# Patient Record
Sex: FEMALE | Race: WHITE | Employment: OTHER | URBAN - NONMETROPOLITAN AREA
[De-identification: names, ages, dates, MRNs, and addresses within clinical notes are randomized per-mention and may not be internally consistent; named-entity substitution may affect disease eponyms.]

---

## 2017-01-12 ENCOUNTER — OFFICE VISIT (OUTPATIENT)
Dept: OPTOMETRY | Age: 82
End: 2017-01-12

## 2017-01-12 DIAGNOSIS — H53.8 BLURRED VISION, RIGHT EYE: ICD-10-CM

## 2017-01-12 DIAGNOSIS — H52.4 HYPEROPIA OF BOTH EYES WITH ASTIGMATISM AND PRESBYOPIA: Primary | ICD-10-CM

## 2017-01-12 DIAGNOSIS — H52.03 HYPEROPIA OF BOTH EYES WITH ASTIGMATISM AND PRESBYOPIA: Primary | ICD-10-CM

## 2017-01-12 DIAGNOSIS — H52.203 HYPEROPIA OF BOTH EYES WITH ASTIGMATISM AND PRESBYOPIA: Primary | ICD-10-CM

## 2017-01-12 PROCEDURE — 92014 COMPRE OPH EXAM EST PT 1/>: CPT | Performed by: OPTOMETRIST

## 2017-01-12 ASSESSMENT — REFRACTION_MANIFEST
OS_SPHERE: +2.25
OS_ADD: +2.75
OS_CYLINDER: -3.50
OD_SPHERE: +1.25
OD_ADD: +2.75
OS_AXIS: 071
OD_CYLINDER: -2.75
OD_AXIS: 097

## 2017-01-12 ASSESSMENT — REFRACTION_WEARINGRX
OD_ADD: +2.75
OS_SPHERE: +2.00
OD_SPHERE: +1.25
OD_CYLINDER: -2.50
OD_AXIS: 097
OS_CYLINDER: -3.00
OS_AXIS: 071

## 2017-01-12 ASSESSMENT — TONOMETRY
OD_IOP_MMHG: 15
IOP_METHOD: APPLANATION W FLURESS DROP
OS_IOP_MMHG: 12

## 2017-01-12 ASSESSMENT — VISUAL ACUITY
METHOD: SNELLEN - LINEAR
CORRECTION_TYPE: GLASSES
OD_CC: 20/30 OU
OS_CC: 20/50

## 2017-01-12 ASSESSMENT — SLIT LAMP EXAM - LIDS: COMMENTS: DERMATOCHALASIS - UPPER LID

## 2017-03-09 ENCOUNTER — OFFICE VISIT (OUTPATIENT)
Dept: OPTOMETRY | Age: 82
End: 2017-03-09

## 2017-03-09 DIAGNOSIS — H52.03 HYPEROPIA OF BOTH EYES WITH ASTIGMATISM AND PRESBYOPIA: ICD-10-CM

## 2017-03-09 DIAGNOSIS — H52.4 HYPEROPIA OF BOTH EYES WITH ASTIGMATISM AND PRESBYOPIA: ICD-10-CM

## 2017-03-09 DIAGNOSIS — H53.8 BLURRED VISION, RIGHT EYE: Primary | ICD-10-CM

## 2017-03-09 DIAGNOSIS — H52.203 HYPEROPIA OF BOTH EYES WITH ASTIGMATISM AND PRESBYOPIA: ICD-10-CM

## 2017-03-09 DIAGNOSIS — H35.3290 MACULAR DEGENERATION, WET (HCC): ICD-10-CM

## 2017-03-09 PROCEDURE — 99212 OFFICE O/P EST SF 10 MIN: CPT | Performed by: OPTOMETRIST

## 2017-03-09 RX ORDER — LEVOFLOXACIN 500 MG/1
500 TABLET, FILM COATED ORAL DAILY
COMMUNITY

## 2017-03-09 RX ORDER — BENOXINATE HCL/FLUORESCEIN SOD 0.4%-0.25%
1 DROPS OPHTHALMIC (EYE) ONCE
Status: COMPLETED | OUTPATIENT
Start: 2017-03-09 | End: 2017-03-09

## 2017-03-09 RX ORDER — ATORVASTATIN CALCIUM 40 MG/1
TABLET, FILM COATED ORAL
COMMUNITY
Start: 2016-12-03 | End: 2017-03-09 | Stop reason: ALTCHOICE

## 2017-03-09 RX ADMIN — Medication 1 DROP: at 13:28

## 2017-03-09 ASSESSMENT — REFRACTION_MANIFEST
OS_ADD: +2.75
OD_SPHERE: +1.75
OS_CYLINDER: -3.50
OD_CYLINDER: -3.25
OS_AXIS: 074
OD_AXIS: 097
OS_SPHERE: +2.25
OD_ADD: +2.75

## 2017-03-09 ASSESSMENT — VISUAL ACUITY
OS_CC+: -1
OS_CC: 20/40
CORRECTION_TYPE: GLASSES
OD_CC: 20/30 OU
METHOD: SNELLEN - LINEAR

## 2017-03-09 ASSESSMENT — REFRACTION_WEARINGRX
SPECS_TYPE: NOT FILLED
OD_AXIS: 097
OD_CYLINDER: -2.75
OS_AXIS: 071
OS_SPHERE: +2.25
OS_ADD: +2.75
OD_ADD: +2.75
OD_SPHERE: +1.25
OS_CYLINDER: -3.50

## 2017-03-09 ASSESSMENT — TONOMETRY
OD_IOP_MMHG: 12
OS_IOP_MMHG: 12
IOP_METHOD: APPLANATION W FLURESS DROP

## 2017-10-18 ENCOUNTER — HOSPITAL ENCOUNTER (OUTPATIENT)
Age: 82
Setting detail: SPECIMEN
Discharge: HOME | End: 2017-10-18
Payer: MEDICARE

## 2017-10-18 LAB
-: ABNORMAL
ALBUMIN SERPL-MCNC: 3.8 G/DL (ref 3.5–5.2)
ALBUMIN/GLOBULIN RATIO: 1.5 (ref 1–2.5)
ALP BLD-CCNC: 45 U/L (ref 35–104)
ALT SERPL-CCNC: 23 U/L (ref 5–33)
AMORPHOUS: ABNORMAL
ANION GAP SERPL CALCULATED.3IONS-SCNC: 12 MMOL/L (ref 9–17)
AST SERPL-CCNC: 19 U/L
BACTERIA: ABNORMAL
BILIRUB SERPL-MCNC: 0.4 MG/DL (ref 0.3–1.2)
BILIRUBIN URINE: NEGATIVE
BUN BLDV-MCNC: 15 MG/DL (ref 8–23)
BUN/CREAT BLD: 18 (ref 9–20)
CALCIUM SERPL-MCNC: 9 MG/DL (ref 8.6–10.4)
CASTS UA: ABNORMAL /LPF (ref 0–2)
CHLORIDE BLD-SCNC: 95 MMOL/L (ref 98–107)
CHOLESTEROL/HDL RATIO: 2.4
CHOLESTEROL: 144 MG/DL
CO2: 29 MMOL/L (ref 20–31)
COLOR: ABNORMAL
COMMENT UA: ABNORMAL
CREAT SERPL-MCNC: 0.83 MG/DL (ref 0.5–0.9)
CRYSTALS, UA: ABNORMAL /HPF
EPITHELIAL CELLS UA: ABNORMAL /HPF (ref 0–5)
GFR AFRICAN AMERICAN: >60 ML/MIN
GFR NON-AFRICAN AMERICAN: >60 ML/MIN
GFR SERPL CREATININE-BSD FRML MDRD: ABNORMAL ML/MIN/{1.73_M2}
GFR SERPL CREATININE-BSD FRML MDRD: ABNORMAL ML/MIN/{1.73_M2}
GLUCOSE BLD-MCNC: 91 MG/DL (ref 70–99)
GLUCOSE URINE: NEGATIVE
HCT VFR BLD CALC: 34.1 % (ref 36–46)
HDLC SERPL-MCNC: 61 MG/DL
HEMOGLOBIN: 11.1 G/DL (ref 12–16)
KETONES, URINE: NEGATIVE
LDL CHOLESTEROL: 66 MG/DL (ref 0–130)
LEUKOCYTE ESTERASE, URINE: ABNORMAL
MCH RBC QN AUTO: 28.6 PG (ref 26–34)
MCHC RBC AUTO-ENTMCNC: 32.7 G/DL (ref 31–37)
MCV RBC AUTO: 87.3 FL (ref 80–100)
MUCUS: ABNORMAL
NITRITE, URINE: NEGATIVE
OTHER OBSERVATIONS UA: ABNORMAL
PDW BLD-RTO: 14.7 % (ref 11–14.5)
PH UA: 6.5 (ref 5–6)
PLATELET # BLD: 210 K/UL (ref 140–450)
PMV BLD AUTO: 7.9 FL (ref 6–12)
POTASSIUM SERPL-SCNC: 3.6 MMOL/L (ref 3.7–5.3)
PROTEIN UA: NEGATIVE
RBC # BLD: 3.9 M/UL (ref 4–5.2)
RBC UA: ABNORMAL /HPF (ref 0–4)
RENAL EPITHELIAL, UA: ABNORMAL /HPF
SODIUM BLD-SCNC: 136 MMOL/L (ref 135–144)
SPECIFIC GRAVITY UA: 1.01 (ref 1.01–1.02)
THYROXINE, FREE: 1.9 NG/DL (ref 0.93–1.7)
TOTAL PROTEIN: 6.3 G/DL (ref 6.4–8.3)
TRICHOMONAS: ABNORMAL
TRIGL SERPL-MCNC: 84 MG/DL
TSH SERPL DL<=0.05 MIU/L-ACNC: 1.29 MIU/L (ref 0.3–5)
TURBIDITY: ABNORMAL
URINE HGB: NEGATIVE
UROBILINOGEN, URINE: NORMAL
VLDLC SERPL CALC-MCNC: NORMAL MG/DL (ref 1–30)
WBC # BLD: 4.2 K/UL (ref 3.5–11)
WBC UA: ABNORMAL /HPF (ref 0–4)
YEAST: ABNORMAL

## 2017-10-18 PROCEDURE — 80053 COMPREHEN METABOLIC PANEL: CPT

## 2017-10-18 PROCEDURE — 81001 URINALYSIS AUTO W/SCOPE: CPT

## 2017-10-18 PROCEDURE — 84443 ASSAY THYROID STIM HORMONE: CPT

## 2017-10-18 PROCEDURE — 80061 LIPID PANEL: CPT

## 2017-10-18 PROCEDURE — 36415 COLL VENOUS BLD VENIPUNCTURE: CPT

## 2017-10-18 PROCEDURE — 84439 ASSAY OF FREE THYROXINE: CPT

## 2017-10-18 PROCEDURE — 85027 COMPLETE CBC AUTOMATED: CPT

## 2018-01-15 ENCOUNTER — HOSPITAL ENCOUNTER (OUTPATIENT)
Age: 83
Setting detail: SPECIMEN
Discharge: HOME | End: 2018-01-15
Payer: MEDICARE

## 2019-04-04 ENCOUNTER — OFFICE VISIT (OUTPATIENT)
Dept: OPTOMETRY | Age: 84
End: 2019-04-04
Payer: COMMERCIAL

## 2019-04-04 DIAGNOSIS — Z96.1 PSEUDOPHAKIA OF BOTH EYES: ICD-10-CM

## 2019-04-04 DIAGNOSIS — H52.4 HYPEROPIA OF BOTH EYES WITH ASTIGMATISM AND PRESBYOPIA: Primary | ICD-10-CM

## 2019-04-04 DIAGNOSIS — H35.30 MACULAR DEGENERATION (SENILE) OF RETINA: ICD-10-CM

## 2019-04-04 DIAGNOSIS — H52.03 HYPEROPIA OF BOTH EYES WITH ASTIGMATISM AND PRESBYOPIA: Primary | ICD-10-CM

## 2019-04-04 DIAGNOSIS — H52.203 HYPEROPIA OF BOTH EYES WITH ASTIGMATISM AND PRESBYOPIA: Primary | ICD-10-CM

## 2019-04-04 PROCEDURE — 92014 COMPRE OPH EXAM EST PT 1/>: CPT | Performed by: OPTOMETRIST

## 2019-04-04 ASSESSMENT — TONOMETRY
OS_IOP_MMHG: 10
IOP_METHOD: NON-CONTACT AIR PUFF
OD_IOP_MMHG: 10

## 2019-04-04 ASSESSMENT — REFRACTION_WEARINGRX
SPECS_TYPE: BIFOCAL
OS_ADD: +2.75
OS_SPHERE: +2.25
OD_ADD: +2.75
OS_CYLINDER: -3.50
OD_AXIS: 097
OD_CYLINDER: -3.25
OS_AXIS: 074
OD_SPHERE: +1.75

## 2019-04-04 ASSESSMENT — REFRACTION_MANIFEST
OD_ADD: +2.75
OS_SPHERE: +2.25
OD_SPHERE: +2.25
OS_CYLINDER: -3.50
OS_AXIS: 074
OD_CYLINDER: -3.25
OS_ADD: +2.75
OD_AXIS: 097

## 2019-04-04 ASSESSMENT — KERATOMETRY
OD_K1POWER_DIOPTERS: 41.25
OD_K2POWER_DIOPTERS: 43.00
OD_AXISANGLE2_DEGREES: 088
OD_AXISANGLE_DEGREES: 178

## 2019-04-04 ASSESSMENT — VISUAL ACUITY
METHOD: SNELLEN - LINEAR
OS_CC: 20/40
OD_CC: 20/50 OU
CORRECTION_TYPE: GLASSES

## 2019-04-04 ASSESSMENT — SLIT LAMP EXAM - LIDS: COMMENTS: DERMATOCHALASIS - UPPER LID

## 2019-04-04 NOTE — PROGRESS NOTES
Lorenzo Del Toro presents today for   Chief Complaint   Patient presents with    Blurred Vision    Vision Exam   .    HPI     Blurred Vision     In both eyes. Vision is blurred. Context:  distance vision and reading. Comments     Last Vision Exam: 3/9/2017 AW  Last Ophthalmology Exam: Dr. Ermias Munoz 3/5/2019  Last Filled Glasses Rx: 6/9/2017  Insurance: retiree Lizet Bain  Update: [employer] and reading are getting more blurry  Sees Dr Ermias Munoz for macular degeneration                     Current Outpatient Medications   Medication Sig Dispense Refill    levofloxacin (LEVAQUIN) 500 MG tablet Take 500 mg by mouth daily      rivaroxaban (XARELTO) 20 MG TABS tablet Take 20 mg by mouth      warfarin (COUMADIN) 4 MG tablet       KLOR-CON M10 10 MEQ tablet       sulfaSALAzine (AZULFIDINE) 500 MG EC tablet       fluticasone (FLONASE) 50 MCG/ACT nasal spray 1 spray by Nasal route 2 times daily.  ferrous sulfate 325 (65 FE) MG tablet Take 325 mg by mouth 2 times daily (with meals).  metoprolol (LOPRESSOR) 50 MG tablet Take 50 mg by mouth 2 times daily.  cholestyramine (QUESTRAN) 4 G packet Take 1 packet by mouth 2 times daily.  diphenoxylate-atropine (LOMOTIL) 2.5-0.025 MG per tablet Take 1 tablet by mouth 3 times daily as needed for Diarrhea.  gabapentin (NEURONTIN) 100 MG capsule Take 100 mg by mouth 3 times daily.  oxyCODONE-acetaminophen (PERCOCET) 5-325 MG per tablet Take 1 tablet by mouth every 12 hours as needed for Pain.  tiZANidine (ZANAFLEX) 4 MG tablet Take 4 mg by mouth every 8 hours as needed.  omeprazole (PRILOSEC) 40 MG capsule Take 40 mg by mouth daily.  warfarin (COUMADIN) 1 MG tablet Take 1 mg by mouth every other day.  levothyroxine (SYNTHROID) 88 MCG tablet Take 88 mcg by mouth Daily.  valsartan-hydrochlorothiazide (DIOVAN HCT) 320-25 MG per tablet Take 1 tablet by mouth daily.       amLODIPine-benazepril (LOTREL) 10-40 MG per Not recorded          Visual Acuity (Snellen - Linear)       Right Left    Dist cc 20/70 20/40    Near cc 20/50 OU     Correction:  Glasses          Pupils     Pupils       Pupils    Right PERRL    Left PERRL              Neuro/Psych     Neuro/Psych     Oriented x3:  Yes    Mood/Affect:  Normal              Keratometry     Keratometry       K1 Axis K2 Axis    Right 41.25 088 43.00 178    Left                      Ophthalmology Exam     Wearing Rx       Sphere Cylinder Axis Add    Right +1.75 -3.25 097 +2.75    Left +2.25 -3.50 074 +2.75    Age:  2yrs    Type:  Bifocal              Manifest Refraction     Manifest Refraction       Sphere Cylinder Axis Dist VA Add    Right +2.25 -3.25 097 20/70 +2.75    Left +2.25 -3.50 074 20/40- +2.75          Manifest Refraction #2 (Auto)       Sphere Cylinder Axis Dist VA Add    Right +2.50 -3.50 092      Left +3.25 +4.25 074                 Final Rx       Sphere Cylinder Axis Add    Right +2.25 -3.25 097 +2.75    Left +2.25 -3.50 074 +2.75    Type:  Bifocal    Expiration Date:  4/4/2021            1. Hyperopia of both eyes with astigmatism and presbyopia    2. Macular degeneration (senile) of retina    3.  Pseudophakia of both eyes           Patient Instructions   New glasses if desired' keep appontments with Dr Yusef Mattson       Return in about 2 years (around 4/4/2021) for complete eye exam.

## 2019-07-07 ENCOUNTER — APPOINTMENT (OUTPATIENT)
Dept: CT IMAGING | Age: 84
End: 2019-07-07
Payer: MEDICARE

## 2019-07-07 ENCOUNTER — HOSPITAL ENCOUNTER (EMERGENCY)
Age: 84
Discharge: HOME | End: 2019-07-07
Attending: EMERGENCY MEDICINE
Payer: MEDICARE

## 2019-07-07 VITALS
RESPIRATION RATE: 12 BRPM | WEIGHT: 182 LBS | OXYGEN SATURATION: 96 % | BODY MASS INDEX: 30.29 KG/M2 | SYSTOLIC BLOOD PRESSURE: 132 MMHG | TEMPERATURE: 96.8 F | HEART RATE: 68 BPM | DIASTOLIC BLOOD PRESSURE: 78 MMHG

## 2019-07-07 DIAGNOSIS — R55 VASOVAGAL SYNCOPE: Primary | ICD-10-CM

## 2019-07-07 LAB
-: ABNORMAL
-: NORMAL
ABSOLUTE EOS #: 0.2 K/UL (ref 0–0.4)
ABSOLUTE IMMATURE GRANULOCYTE: ABNORMAL K/UL (ref 0–0.3)
ABSOLUTE LYMPH #: 1.4 K/UL (ref 1–4.8)
ABSOLUTE MONO #: 0.6 K/UL (ref 0.1–1.2)
ALBUMIN SERPL-MCNC: 4 G/DL (ref 3.5–5.2)
ALBUMIN/GLOBULIN RATIO: 1.7 (ref 1–2.5)
ALP BLD-CCNC: 57 U/L (ref 35–104)
ALT SERPL-CCNC: 22 U/L (ref 5–33)
AMORPHOUS: ABNORMAL
ANION GAP SERPL CALCULATED.3IONS-SCNC: 12 MMOL/L (ref 9–17)
AST SERPL-CCNC: 24 U/L
BACTERIA: ABNORMAL
BASOPHILS # BLD: 1 % (ref 0–1)
BASOPHILS ABSOLUTE: 0.1 K/UL (ref 0–0.2)
BILIRUB SERPL-MCNC: 0.43 MG/DL (ref 0.3–1.2)
BILIRUBIN URINE: NEGATIVE
BNP INTERPRETATION: NORMAL
BUN BLDV-MCNC: 12 MG/DL (ref 8–23)
BUN/CREAT BLD: 11 (ref 9–20)
CALCIUM SERPL-MCNC: 8.9 MG/DL (ref 8.6–10.4)
CASTS UA: ABNORMAL /LPF (ref 0–2)
CHLORIDE BLD-SCNC: 90 MMOL/L (ref 98–107)
CO2: 29 MMOL/L (ref 20–31)
COLOR: ABNORMAL
COMMENT UA: ABNORMAL
CREAT SERPL-MCNC: 1.09 MG/DL (ref 0.5–0.9)
CRYSTALS, UA: ABNORMAL /HPF
D DIMER: 783 NG/ML
DIFFERENTIAL TYPE: ABNORMAL
EOSINOPHILS RELATIVE PERCENT: 3 % (ref 1–7)
EPITHELIAL CELLS UA: ABNORMAL /HPF (ref 0–5)
GFR AFRICAN AMERICAN: 58 ML/MIN
GFR NON-AFRICAN AMERICAN: 48 ML/MIN
GFR SERPL CREATININE-BSD FRML MDRD: ABNORMAL ML/MIN/{1.73_M2}
GFR SERPL CREATININE-BSD FRML MDRD: ABNORMAL ML/MIN/{1.73_M2}
GLUCOSE BLD-MCNC: 85 MG/DL (ref 70–99)
GLUCOSE URINE: NEGATIVE
HCT VFR BLD CALC: 34.2 % (ref 36–46)
HEMOGLOBIN: 11.3 G/DL (ref 12–16)
IMMATURE GRANULOCYTES: ABNORMAL %
INR BLD: 1.1
KETONES, URINE: NEGATIVE
LACTIC ACID: 1.7 MMOL/L (ref 0.5–2.2)
LEUKOCYTE ESTERASE, URINE: ABNORMAL
LYMPHOCYTES # BLD: 26 % (ref 16–46)
MAGNESIUM: 1.8 MG/DL (ref 1.6–2.6)
MCH RBC QN AUTO: 29.3 PG (ref 26–34)
MCHC RBC AUTO-ENTMCNC: 32.9 G/DL (ref 31–37)
MCV RBC AUTO: 88.9 FL (ref 80–100)
MONOCYTES # BLD: 11 % (ref 4–11)
MUCUS: ABNORMAL
NITRITE, URINE: NEGATIVE
NRBC AUTOMATED: ABNORMAL PER 100 WBC
OTHER OBSERVATIONS UA: ABNORMAL
PARTIAL THROMBOPLASTIN TIME: 28.1 SEC (ref 27–35)
PDW BLD-RTO: 15.1 % (ref 11–14.5)
PH UA: 7.5 (ref 5–6)
PLATELET # BLD: 257 K/UL (ref 140–450)
PLATELET ESTIMATE: ABNORMAL
PMV BLD AUTO: 8.3 FL (ref 6–12)
POTASSIUM SERPL-SCNC: 3.8 MMOL/L (ref 3.7–5.3)
PRO-BNP: 293 PG/ML
PROTEIN UA: NEGATIVE
PROTHROMBIN TIME: 11.2 SEC (ref 9.4–11.3)
RBC # BLD: 3.85 M/UL (ref 4–5.2)
RBC # BLD: ABNORMAL 10*6/UL
RBC UA: ABNORMAL /HPF (ref 0–4)
REASON FOR REJECTION: NORMAL
RENAL EPITHELIAL, UA: ABNORMAL /HPF
SEG NEUTROPHILS: 59 % (ref 43–77)
SEGMENTED NEUTROPHILS ABSOLUTE COUNT: 3.2 K/UL (ref 1.8–7.7)
SODIUM BLD-SCNC: 131 MMOL/L (ref 135–144)
SPECIFIC GRAVITY UA: 1.01 (ref 1.01–1.02)
TOTAL PROTEIN: 6.3 G/DL (ref 6.4–8.3)
TRICHOMONAS: ABNORMAL
TROPONIN INTERP: NORMAL
TROPONIN T: NORMAL NG/ML
TROPONIN, HIGH SENSITIVITY: 12 NG/L (ref 0–14)
TURBIDITY: ABNORMAL
URINE HGB: NEGATIVE
UROBILINOGEN, URINE: NORMAL
WBC # BLD: 5.5 K/UL (ref 3.5–11)
WBC # BLD: ABNORMAL 10*3/UL
WBC UA: ABNORMAL /HPF (ref 0–4)
YEAST: ABNORMAL
ZZ NTE CLEAN UP: ORDERED TEST: NORMAL
ZZ NTE WITH NAME CLEAN UP: SPECIMEN SOURCE: NORMAL

## 2019-07-07 PROCEDURE — 83605 ASSAY OF LACTIC ACID: CPT

## 2019-07-07 PROCEDURE — 6360000004 HC RX CONTRAST MEDICATION: Performed by: EMERGENCY MEDICINE

## 2019-07-07 PROCEDURE — 71260 CT THORAX DX C+: CPT

## 2019-07-07 PROCEDURE — 93005 ELECTROCARDIOGRAM TRACING: CPT | Performed by: EMERGENCY MEDICINE

## 2019-07-07 PROCEDURE — 80053 COMPREHEN METABOLIC PANEL: CPT

## 2019-07-07 PROCEDURE — 85025 COMPLETE CBC W/AUTO DIFF WBC: CPT

## 2019-07-07 PROCEDURE — 81001 URINALYSIS AUTO W/SCOPE: CPT

## 2019-07-07 PROCEDURE — 70450 CT HEAD/BRAIN W/O DYE: CPT

## 2019-07-07 PROCEDURE — 83880 ASSAY OF NATRIURETIC PEPTIDE: CPT

## 2019-07-07 PROCEDURE — 85610 PROTHROMBIN TIME: CPT

## 2019-07-07 PROCEDURE — 83735 ASSAY OF MAGNESIUM: CPT

## 2019-07-07 PROCEDURE — 99284 EMERGENCY DEPT VISIT MOD MDM: CPT

## 2019-07-07 PROCEDURE — 84484 ASSAY OF TROPONIN QUANT: CPT

## 2019-07-07 PROCEDURE — 36415 COLL VENOUS BLD VENIPUNCTURE: CPT

## 2019-07-07 PROCEDURE — 2580000003 HC RX 258: Performed by: EMERGENCY MEDICINE

## 2019-07-07 PROCEDURE — 85379 FIBRIN DEGRADATION QUANT: CPT

## 2019-07-07 PROCEDURE — 85730 THROMBOPLASTIN TIME PARTIAL: CPT

## 2019-07-07 RX ORDER — 0.9 % SODIUM CHLORIDE 0.9 %
500 INTRAVENOUS SOLUTION INTRAVENOUS ONCE
Status: COMPLETED | OUTPATIENT
Start: 2019-07-07 | End: 2019-07-07

## 2019-07-07 RX ADMIN — SODIUM CHLORIDE 500 ML: 9 INJECTION, SOLUTION INTRAVENOUS at 15:15

## 2019-07-07 RX ADMIN — IOPAMIDOL 90 ML: 755 INJECTION, SOLUTION INTRAVENOUS at 15:31

## 2019-07-07 ASSESSMENT — ENCOUNTER SYMPTOMS
ABDOMINAL PAIN: 0
VOMITING: 0
COUGH: 0

## 2019-07-07 NOTE — ED PROVIDER NOTES
Final Result   Chronic microvascular disease with age-appropriate cerebral volume loss and   no acute intracranial abnormality. ED Course as of Jul 07 1549   Ilene Mortimer Jul 07, 2019   1517 EKG unremarkable, labs grossly unremarkable but d-dimer elevated so I did order CT for PE. I ordered a bolus to pretreat/post treat because her renal function is borderline    [TS]   1548 CT for PE is negative. At this time, patient will be discharged with instructions to hydrate, rest and follow up with PCP. I suspect this was vasovagal in nature. At this time the patient is without objective evidence of an acute process requiring hospitalization or inpatient management. They have remained hemodynamically stable throughout their entire ED visit and are stable for discharge with outpatient follow-up. Standard anticipatory guidance given to patient upon discharge. Have given them a specific time frame in which to follow-up and who to follow-up with. I have also advised them that they should return to the emergency department if they get worse, or not getting better or develop any new or concerning symptoms. Patient demonstrates understanding.      [TS]      ED Course User Index  [TS] Elvia Good DO         PROCEDURES:  Unless otherwise noted below, none     Procedures    FINAL IMPRESSION      1.  Vasovagal syncope          DISPOSITION/PLAN   DISPOSITION Decision To Discharge 07/07/2019 03:49:04 PM      PATIENT REFERRED TO:  Delgado Calderon MD  [facility] [address]  [address] [facility]  [phone]    In 2 days        DISCHARGE MEDICATIONS:  New Prescriptions    No medications on file          (Please note that portions of this note were completed with a voice recognition program.  Efforts were made to edit the dictations but occasionally words are mis-transcribed.)    Elvia Good DO (electronically signed)  Board Certified Emergency Physician          Elvia Good DO  07/07/19 7825

## 2019-07-08 LAB
EKG ATRIAL RATE: 62 BPM
EKG P AXIS: 48 DEGREES
EKG P-R INTERVAL: 250 MS
EKG Q-T INTERVAL: 462 MS
EKG QRS DURATION: 88 MS
EKG QTC CALCULATION (BAZETT): 468 MS
EKG R AXIS: 47 DEGREES
EKG T AXIS: 57 DEGREES
EKG VENTRICULAR RATE: 62 BPM

## 2019-12-18 ENCOUNTER — HOSPITAL ENCOUNTER (OUTPATIENT)
Age: 84
Setting detail: SPECIMEN
Discharge: HOME | End: 2019-12-18
Payer: MEDICARE

## 2019-12-18 LAB
THYROXINE, FREE: 1.21 NG/DL (ref 0.93–1.7)
TSH SERPL DL<=0.05 MIU/L-ACNC: 5.95 MIU/L (ref 0.3–5)

## 2019-12-18 PROCEDURE — 84443 ASSAY THYROID STIM HORMONE: CPT

## 2019-12-18 PROCEDURE — 36415 COLL VENOUS BLD VENIPUNCTURE: CPT

## 2019-12-18 PROCEDURE — 84439 ASSAY OF FREE THYROXINE: CPT

## 2020-10-21 ENCOUNTER — HOSPITAL ENCOUNTER (OUTPATIENT)
Age: 85
Setting detail: SPECIMEN
Discharge: HOME | End: 2020-10-21
Payer: COMMERCIAL

## 2020-10-21 LAB
-: ABNORMAL
AMORPHOUS: ABNORMAL
BACTERIA: ABNORMAL
BILIRUBIN URINE: NEGATIVE
CASTS UA: ABNORMAL /LPF (ref 0–2)
CASTS UA: ABNORMAL /LPF (ref 0–2)
COLOR: NORMAL
COMMENT UA: NORMAL
CRYSTALS, UA: ABNORMAL /HPF
EPITHELIAL CELLS UA: ABNORMAL /HPF (ref 0–5)
GLUCOSE URINE: NEGATIVE
KETONES, URINE: NEGATIVE
LEUKOCYTE ESTERASE, URINE: NEGATIVE
MUCUS: ABNORMAL
NITRITE, URINE: NEGATIVE
OTHER OBSERVATIONS UA: ABNORMAL
PH UA: 6 (ref 5–6)
PROTEIN UA: NEGATIVE
RBC UA: ABNORMAL /HPF (ref 0–4)
RENAL EPITHELIAL, UA: ABNORMAL /HPF
SPECIFIC GRAVITY UA: 1.01 (ref 1.01–1.02)
TRICHOMONAS: ABNORMAL
TURBIDITY: NORMAL
URINE HGB: NEGATIVE
UROBILINOGEN, URINE: NORMAL
WBC UA: ABNORMAL /HPF (ref 0–4)
YEAST: ABNORMAL

## 2020-10-21 PROCEDURE — 87086 URINE CULTURE/COLONY COUNT: CPT

## 2020-10-21 PROCEDURE — 87186 SC STD MICRODIL/AGAR DIL: CPT

## 2020-10-21 PROCEDURE — 81001 URINALYSIS AUTO W/SCOPE: CPT

## 2020-10-21 PROCEDURE — 87088 URINE BACTERIA CULTURE: CPT

## 2020-10-21 PROCEDURE — 87077 CULTURE AEROBIC IDENTIFY: CPT

## 2020-10-23 LAB
CULTURE: ABNORMAL
CULTURE: ABNORMAL
Lab: ABNORMAL
SPECIMEN DESCRIPTION: ABNORMAL

## 2020-11-05 ENCOUNTER — HOSPITAL ENCOUNTER (OUTPATIENT)
Age: 85
Setting detail: SPECIMEN
Discharge: HOME | End: 2020-11-05
Payer: MEDICARE

## 2020-11-05 PROCEDURE — 81001 URINALYSIS AUTO W/SCOPE: CPT

## 2020-11-05 PROCEDURE — 87086 URINE CULTURE/COLONY COUNT: CPT

## 2020-11-05 PROCEDURE — 87186 SC STD MICRODIL/AGAR DIL: CPT

## 2020-11-05 PROCEDURE — 87088 URINE BACTERIA CULTURE: CPT

## 2020-11-06 LAB
-: NORMAL
AMORPHOUS: NORMAL
BACTERIA: NORMAL
BILIRUBIN URINE: NEGATIVE
CASTS UA: NORMAL /LPF (ref 0–2)
COLOR: ABNORMAL
COMMENT UA: ABNORMAL
CRYSTALS, UA: NORMAL /HPF
EPITHELIAL CELLS UA: NORMAL /HPF (ref 0–5)
GLUCOSE URINE: NEGATIVE
KETONES, URINE: NEGATIVE
LEUKOCYTE ESTERASE, URINE: NEGATIVE
MUCUS: NORMAL
NITRITE, URINE: NEGATIVE
OTHER OBSERVATIONS UA: NORMAL
PH UA: 6.5 (ref 5–6)
PROTEIN UA: NEGATIVE
RBC UA: NORMAL /HPF (ref 0–4)
RENAL EPITHELIAL, UA: NORMAL /HPF
SPECIFIC GRAVITY UA: 1.01 (ref 1.01–1.02)
TRICHOMONAS: NORMAL
TURBIDITY: ABNORMAL
URINE HGB: NEGATIVE
UROBILINOGEN, URINE: NORMAL
WBC UA: NORMAL /HPF (ref 0–4)
YEAST: NORMAL

## 2020-11-08 LAB
CULTURE: ABNORMAL
Lab: ABNORMAL
SPECIMEN DESCRIPTION: ABNORMAL

## 2021-01-01 ENCOUNTER — HOSPITAL ENCOUNTER (OUTPATIENT)
Age: 86
Setting detail: SPECIMEN
Discharge: HOME | End: 2021-08-04
Payer: MEDICARE

## 2021-01-01 ENCOUNTER — HOSPITAL ENCOUNTER (OUTPATIENT)
Age: 86
Setting detail: SPECIMEN
Discharge: HOME | End: 2021-12-26
Payer: MEDICARE

## 2021-01-01 ENCOUNTER — OFFICE VISIT (OUTPATIENT)
Dept: OPTOMETRY | Age: 86
End: 2021-01-01
Payer: COMMERCIAL

## 2021-01-01 ENCOUNTER — HOSPITAL ENCOUNTER (OUTPATIENT)
Age: 86
Setting detail: SPECIMEN
Discharge: HOME | End: 2021-06-15
Payer: MEDICARE

## 2021-01-01 ENCOUNTER — HOSPITAL ENCOUNTER (OUTPATIENT)
Age: 86
Setting detail: SPECIMEN
Discharge: HOME | End: 2021-10-26
Payer: MEDICARE

## 2021-01-01 ENCOUNTER — HOSPITAL ENCOUNTER (OUTPATIENT)
Age: 86
Setting detail: SPECIMEN
Discharge: HOME | End: 2021-07-27
Payer: MEDICARE

## 2021-01-01 ENCOUNTER — HOSPITAL ENCOUNTER (OUTPATIENT)
Age: 86
Setting detail: SPECIMEN
Discharge: HOME | End: 2021-02-10
Payer: COMMERCIAL

## 2021-01-01 DIAGNOSIS — H52.03 HYPEROPIA OF BOTH EYES WITH ASTIGMATISM AND PRESBYOPIA: Primary | ICD-10-CM

## 2021-01-01 DIAGNOSIS — Z96.1 PSEUDOPHAKIA OF BOTH EYES: ICD-10-CM

## 2021-01-01 DIAGNOSIS — H52.4 HYPEROPIA OF BOTH EYES WITH ASTIGMATISM AND PRESBYOPIA: Primary | ICD-10-CM

## 2021-01-01 DIAGNOSIS — H35.353 CYSTOID MACULAR DEGENERATION OF BOTH EYES: ICD-10-CM

## 2021-01-01 DIAGNOSIS — H52.203 HYPEROPIA OF BOTH EYES WITH ASTIGMATISM AND PRESBYOPIA: Primary | ICD-10-CM

## 2021-01-01 LAB
-: ABNORMAL
AMORPHOUS: ABNORMAL
BACTERIA: ABNORMAL
BILIRUBIN URINE: NEGATIVE
CASTS UA: ABNORMAL /LPF (ref 0–2)
CHOLESTEROL/HDL RATIO: 2.6
CHOLESTEROL: 128 MG/DL
COLOR: ABNORMAL
COMMENT UA: ABNORMAL
CRYSTALS, UA: ABNORMAL /HPF
CULTURE: ABNORMAL
CULTURE: NORMAL
EPITHELIAL CELLS UA: ABNORMAL /HPF (ref 0–5)
GLUCOSE URINE: NEGATIVE
HDLC SERPL-MCNC: 49 MG/DL
KETONES, URINE: NEGATIVE
LDL CHOLESTEROL: 55 MG/DL (ref 0–130)
LEUKOCYTE ESTERASE, URINE: ABNORMAL
LEUKOCYTE ESTERASE, URINE: NEGATIVE
Lab: ABNORMAL
Lab: NORMAL
MUCUS: ABNORMAL
NITRITE, URINE: NEGATIVE
OTHER OBSERVATIONS UA: ABNORMAL
PH UA: 6 (ref 5–6)
PH UA: 7 (ref 5–6)
PH UA: 7 (ref 5–6)
PROTEIN UA: NEGATIVE
RBC UA: ABNORMAL /HPF (ref 0–4)
RENAL EPITHELIAL, UA: ABNORMAL /HPF
SPECIFIC GRAVITY UA: 1 (ref 1.01–1.02)
SPECIFIC GRAVITY UA: 1.01 (ref 1.01–1.02)
SPECIFIC GRAVITY UA: 1.01 (ref 1.01–1.02)
SPECIFIC GRAVITY UA: 1.02 (ref 1.01–1.02)
SPECIFIC GRAVITY UA: 1.02 (ref 1.01–1.02)
SPECIMEN DESCRIPTION: ABNORMAL
SPECIMEN DESCRIPTION: NORMAL
TRICHOMONAS: ABNORMAL
TRIGL SERPL-MCNC: 122 MG/DL
TURBIDITY: ABNORMAL
URINE HGB: ABNORMAL
URINE HGB: NEGATIVE
UROBILINOGEN, URINE: NORMAL
VLDLC SERPL CALC-MCNC: NORMAL MG/DL (ref 1–30)
WBC UA: >100 /HPF (ref 0–4)
WBC UA: ABNORMAL /HPF (ref 0–4)
YEAST: ABNORMAL

## 2021-01-01 PROCEDURE — 87186 SC STD MICRODIL/AGAR DIL: CPT

## 2021-01-01 PROCEDURE — 92014 COMPRE OPH EXAM EST PT 1/>: CPT | Performed by: OPTOMETRIST

## 2021-01-01 PROCEDURE — 87086 URINE CULTURE/COLONY COUNT: CPT

## 2021-01-01 PROCEDURE — 81001 URINALYSIS AUTO W/SCOPE: CPT

## 2021-01-01 PROCEDURE — 80061 LIPID PANEL: CPT

## 2021-01-01 PROCEDURE — 87088 URINE BACTERIA CULTURE: CPT

## 2021-01-01 PROCEDURE — 36415 COLL VENOUS BLD VENIPUNCTURE: CPT

## 2021-01-01 ASSESSMENT — REFRACTION_WEARINGRX
OD_ADD: +2.75
OS_CYLINDER: -3.50
SPECS_TYPE: BIFOCAL
OS_SPHERE: +2.25
OD_AXIS: 097
OD_SPHERE: +2.25
OS_AXIS: 074
OD_CYLINDER: -3.25
OS_ADD: +2.75

## 2021-01-01 ASSESSMENT — REFRACTION_MANIFEST
OS_ADD: +3.25
OS_AXIS: 076
OS_CYLINDER: -3.50
OD_AXIS: 097
OD_CYLINDER: -3.25
OD_SPHERE: +2.25
OD_ADD: +2.75
OS_SPHERE: +2.25

## 2021-01-01 ASSESSMENT — VISUAL ACUITY
CORRECTION_TYPE: GLASSES
METHOD: SNELLEN - LINEAR
OS_CC: 20/50
OD_CC: 20/70 OU
OS_CC+: -2

## 2021-01-01 ASSESSMENT — TONOMETRY
OD_IOP_MMHG: 13
IOP_METHOD: NON-CONTACT AIR PUFF
OS_IOP_MMHG: 14

## 2021-01-01 ASSESSMENT — ENCOUNTER SYMPTOMS
ALLERGIC/IMMUNOLOGIC NEGATIVE: 0
GASTROINTESTINAL NEGATIVE: 0
RESPIRATORY NEGATIVE: 0
EYES NEGATIVE: 0

## 2021-01-01 ASSESSMENT — SLIT LAMP EXAM - LIDS: COMMENTS: DERMATOCHALASIS - UPPER LID

## 2021-01-01 ASSESSMENT — KERATOMETRY
OD_AXISANGLE2_DEGREES: 080
OD_K1POWER_DIOPTERS: 41.50
OD_K2POWER_DIOPTERS: 43.25
OD_AXISANGLE_DEGREES: 170
METHOD_AUTO_MANUAL: AUTOMATED

## 2021-05-20 NOTE — PROGRESS NOTES
Roxie Lesches presents today for   Chief Complaint   Patient presents with    Blurred Vision    Vision Exam   .    HPI     Blurred Vision     In both eyes. Vision is blurred. Context:  distance vision and reading. Comments     Last Vision Exam: 4/4/2019 aw  Last Ophthalmology Exam: Dr. Juan Salomon Last week  Last Filled Glasses Rx: 4/4/2019  Insurance: Guy Jauregui  Update: Glasses  Distance and reading are getting more blurry  Would like to have the bifocal lowered                 Current Outpatient Medications   Medication Sig Dispense Refill    levofloxacin (LEVAQUIN) 500 MG tablet Take 500 mg by mouth daily      rivaroxaban (XARELTO) 20 MG TABS tablet Take 20 mg by mouth      warfarin (COUMADIN) 4 MG tablet       KLOR-CON M10 10 MEQ tablet       sulfaSALAzine (AZULFIDINE) 500 MG EC tablet       fluticasone (FLONASE) 50 MCG/ACT nasal spray 1 spray by Nasal route 2 times daily.  ferrous sulfate 325 (65 FE) MG tablet Take 325 mg by mouth 2 times daily (with meals).  metoprolol (LOPRESSOR) 50 MG tablet Take 50 mg by mouth 2 times daily.  cholestyramine (QUESTRAN) 4 G packet Take 1 packet by mouth 2 times daily.  diphenoxylate-atropine (LOMOTIL) 2.5-0.025 MG per tablet Take 1 tablet by mouth 3 times daily as needed for Diarrhea.  gabapentin (NEURONTIN) 100 MG capsule Take 100 mg by mouth 3 times daily.  oxyCODONE-acetaminophen (PERCOCET) 5-325 MG per tablet Take 1 tablet by mouth every 12 hours as needed for Pain.  tiZANidine (ZANAFLEX) 4 MG tablet Take 4 mg by mouth every 8 hours as needed.  omeprazole (PRILOSEC) 40 MG capsule Take 40 mg by mouth daily.  warfarin (COUMADIN) 1 MG tablet Take 1 mg by mouth every other day.  levothyroxine (SYNTHROID) 88 MCG tablet Take 88 mcg by mouth Daily.  valsartan-hydrochlorothiazide (DIOVAN HCT) 320-25 MG per tablet Take 1 tablet by mouth daily.       amLODIPine-benazepril (LOTREL) 10-40 MG per capsule Take 1  Frequency of Communication with Friends and Family:     Frequency of Social Gatherings with Friends and Family:     Attends [employer] Services:     Active Member of Clubs or Organizations:     Attends Club or Organization Meetings:     Marital Status:    Intimate Partner Violence:     Fear of Current or Ex-Partner:     Emotionally Abused:     Physically Abused:     Sexually Abused:      Past Medical History:   Diagnosis Date    Anxiety disorder     Atrial fibrillation ([address]     Bilateral pseudophakia     Chronic low back pain     Chronic neck pain     Diverticular disease     Exudative macular degeneration ([address]     left eye; moderate chronic, active    Finger pain, right     4th finger    Hyperlipidemia     Hypertension     Hypothyroidism     Insomnia     Left ventricular hypertrophy     Mitral valve prolapse     Nonexudative senile macular retinal degeneration     right eye; mild chronic, progressive    Osteoarthritis     Osteoporosis     Other social stressor     Postmenopausal hormone replacement therapy     Right upper quadrant abdominal pain     with gas and bloating    Skin lesions, generalized     and moles present neck, right waist anteriorly    Stroke [facility]) Dec 2005    with right-sided hemiparesis    Tobacco abuse     history of, quit in 2003       ROS     Negative for: Constitutional, Gastrointestinal, Neurological, Skin, Genitourinary, Musculoskeletal, HENT, Endocrine, Cardiovascular, Eyes, Respiratory, Psychiatric, Allergic/Imm, Heme/Lymph          Main Ophthalmology Exam     External Exam       Right Left    External Normal Normal          Slit Lamp Exam       Right Left    Lids/Lashes  Dermatochalasis - upper lid    Conjunctiva/Sclera White and quiet White and quiet    Cornea Clear Clear    Anterior Chamber Deep and quiet Deep and quiet    Iris Round and reactive Round and reactive    Lens Open posterior capsule, Posterior chamber intraocular lens Posterior chamber 5/20/2023) for complete eye exam.